# Patient Record
Sex: FEMALE | Race: WHITE | Employment: OTHER | URBAN - METROPOLITAN AREA
[De-identification: names, ages, dates, MRNs, and addresses within clinical notes are randomized per-mention and may not be internally consistent; named-entity substitution may affect disease eponyms.]

---

## 2019-01-10 PROBLEM — R63.4 WEIGHT LOSS, UNINTENTIONAL: Status: ACTIVE | Noted: 2019-01-10

## 2019-01-24 PROBLEM — F33.9 EPISODE OF RECURRENT MAJOR DEPRESSIVE DISORDER (HCC): Status: ACTIVE | Noted: 2019-01-24

## 2019-01-24 PROBLEM — J20.8 ACUTE BRONCHITIS DUE TO OTHER SPECIFIED ORGANISMS: Status: ACTIVE | Noted: 2019-01-24

## 2019-01-24 PROBLEM — F33.9 EPISODE OF RECURRENT MAJOR DEPRESSIVE DISORDER: Status: ACTIVE | Noted: 2019-01-24

## 2019-05-30 PROBLEM — J44.89 COPD (CHRONIC OBSTRUCTIVE PULMONARY DISEASE) WITH CHRONIC BRONCHITIS (HCC): Status: ACTIVE | Noted: 2019-05-30

## 2019-05-30 PROBLEM — F32.A ANXIETY AND DEPRESSION: Status: ACTIVE | Noted: 2019-05-30

## 2019-05-30 PROBLEM — J20.8 ACUTE BRONCHITIS DUE TO OTHER SPECIFIED ORGANISMS: Status: RESOLVED | Noted: 2019-01-24 | Resolved: 2019-05-30

## 2019-05-30 PROBLEM — F41.9 ANXIETY AND DEPRESSION: Status: ACTIVE | Noted: 2019-05-30

## 2019-05-30 PROBLEM — I10 ESSENTIAL HYPERTENSION: Status: ACTIVE | Noted: 2019-05-30

## 2019-05-30 PROBLEM — J44.89 COPD (CHRONIC OBSTRUCTIVE PULMONARY DISEASE) WITH CHRONIC BRONCHITIS: Status: ACTIVE | Noted: 2019-05-30

## 2019-05-30 PROBLEM — J44.9 COPD (CHRONIC OBSTRUCTIVE PULMONARY DISEASE) WITH CHRONIC BRONCHITIS (HCC): Status: ACTIVE | Noted: 2019-05-30

## 2019-05-30 PROBLEM — G47.00 INSOMNIA, UNSPECIFIED TYPE: Status: ACTIVE | Noted: 2019-05-30

## 2019-11-21 PROBLEM — R68.2 DRY MOUTH: Status: ACTIVE | Noted: 2019-11-21

## 2019-11-21 PROBLEM — E78.00 HIGH CHOLESTEROL: Status: ACTIVE | Noted: 2019-11-21

## 2020-01-03 PROBLEM — H72.92 PERFORATED EAR DRUM, LEFT: Status: ACTIVE | Noted: 2020-01-03

## 2020-02-17 ENCOUNTER — OFFICE VISIT (OUTPATIENT)
Dept: AUDIOLOGY | Facility: CLINIC | Age: 75
End: 2020-02-17
Payer: MEDICARE

## 2020-02-17 DIAGNOSIS — H72.92 UNSPECIFIED PERFORATION OF TYMPANIC MEMBRANE, LEFT EAR: Primary | ICD-10-CM

## 2020-02-17 PROCEDURE — 92567 TYMPANOMETRY: CPT | Performed by: AUDIOLOGIST

## 2020-02-17 PROCEDURE — 92557 COMPREHENSIVE HEARING TEST: CPT | Performed by: AUDIOLOGIST

## 2020-02-18 NOTE — PROGRESS NOTES
AUDIOGRAM     Gatito Merchant was referred for testing by Chu Walker. 11/30/1945  [id number]        History      Ms. Mykel Johnston is here today for an audiological evaluation  She has a known tympanic membrane perforation in her left ear.   This has be compliance and ear canal volumes in the right ear. Tympanogram was  flat with a large ear canal volume, consistent with a non-intact TM. in the left ear        Recommendations: Follow up with Dr. Karla Durbin.   Audiological monitoring as needed during the

## 2021-02-03 DIAGNOSIS — Z23 NEED FOR VACCINATION: ICD-10-CM

## 2021-07-19 ENCOUNTER — APPOINTMENT (OUTPATIENT)
Dept: GENERAL RADIOLOGY | Facility: HOSPITAL | Age: 76
DRG: 190 | End: 2021-07-19
Attending: EMERGENCY MEDICINE
Payer: MEDICARE

## 2021-07-19 ENCOUNTER — HOSPITAL ENCOUNTER (INPATIENT)
Facility: HOSPITAL | Age: 76
LOS: 1 days | Discharge: HOME | DRG: 190 | End: 2021-07-22
Attending: EMERGENCY MEDICINE | Admitting: HOSPITALIST
Payer: MEDICARE

## 2021-07-19 DIAGNOSIS — R09.02 HYPOXIA: ICD-10-CM

## 2021-07-19 DIAGNOSIS — J40 BRONCHITIS: Primary | ICD-10-CM

## 2021-07-19 DIAGNOSIS — J98.01 BRONCHOSPASM: ICD-10-CM

## 2021-07-19 DIAGNOSIS — J84.10 PULMONARY FIBROSIS (HCC): ICD-10-CM

## 2021-07-19 LAB
ANION GAP SERPL CALC-SCNC: 9 MMOL/L (ref 0–18)
BASOPHILS # BLD AUTO: 0.01 X10(3) UL (ref 0–0.2)
BASOPHILS NFR BLD AUTO: 0.1 %
BUN BLD-MCNC: 16 MG/DL (ref 7–18)
BUN/CREAT SERPL: 18.4 (ref 10–20)
CALCIUM BLD-MCNC: 8.7 MG/DL (ref 8.5–10.1)
CHLORIDE SERPL-SCNC: 111 MMOL/L (ref 98–112)
CO2 SERPL-SCNC: 22 MMOL/L (ref 21–32)
CREAT BLD-MCNC: 0.87 MG/DL
DEPRECATED RDW RBC AUTO: 47.8 FL (ref 35.1–46.3)
EOSINOPHIL # BLD AUTO: 0.02 X10(3) UL (ref 0–0.7)
EOSINOPHIL NFR BLD AUTO: 0.2 %
ERYTHROCYTE [DISTWIDTH] IN BLOOD BY AUTOMATED COUNT: 13.9 % (ref 11–15)
FLUAV + FLUBV RNA SPEC NAA+PROBE: NEGATIVE
FLUAV + FLUBV RNA SPEC NAA+PROBE: NEGATIVE
GLUCOSE BLD-MCNC: 102 MG/DL (ref 70–99)
HCT VFR BLD AUTO: 32.2 %
HGB BLD-MCNC: 10.9 G/DL
IMM GRANULOCYTES # BLD AUTO: 0.03 X10(3) UL (ref 0–1)
IMM GRANULOCYTES NFR BLD: 0.3 %
LYMPHOCYTES # BLD AUTO: 1.81 X10(3) UL (ref 1–4)
LYMPHOCYTES NFR BLD AUTO: 18.6 %
MCH RBC QN AUTO: 32.2 PG (ref 26–34)
MCHC RBC AUTO-ENTMCNC: 33.9 G/DL (ref 31–37)
MCV RBC AUTO: 95 FL
MONOCYTES # BLD AUTO: 0.4 X10(3) UL (ref 0.1–1)
MONOCYTES NFR BLD AUTO: 4.1 %
NEUTROPHILS # BLD AUTO: 7.47 X10 (3) UL (ref 1.5–7.7)
NEUTROPHILS # BLD AUTO: 7.47 X10(3) UL (ref 1.5–7.7)
NEUTROPHILS NFR BLD AUTO: 76.7 %
NT-PROBNP SERPL-MCNC: 272 PG/ML (ref ?–450)
OSMOLALITY SERPL CALC.SUM OF ELEC: 295 MOSM/KG (ref 275–295)
PLATELET # BLD AUTO: 183 10(3)UL (ref 150–450)
POTASSIUM SERPL-SCNC: 3.8 MMOL/L (ref 3.5–5.1)
PROCALCITONIN SERPL-MCNC: 0.02 NG/ML (ref ?–0.16)
RBC # BLD AUTO: 3.39 X10(6)UL
RSV RNA SPEC NAA+PROBE: NEGATIVE
SARS-COV-2 RNA RESP QL NAA+PROBE: NOT DETECTED
SODIUM SERPL-SCNC: 142 MMOL/L (ref 136–145)
TROPONIN I SERPL-MCNC: <0.045 NG/ML (ref ?–0.04)
WBC # BLD AUTO: 9.7 X10(3) UL (ref 4–11)

## 2021-07-19 PROCEDURE — 71045 X-RAY EXAM CHEST 1 VIEW: CPT | Performed by: EMERGENCY MEDICINE

## 2021-07-19 RX ORDER — METHYLPREDNISOLONE SODIUM SUCCINATE 40 MG/ML
40 INJECTION, POWDER, LYOPHILIZED, FOR SOLUTION INTRAMUSCULAR; INTRAVENOUS EVERY 8 HOURS
Status: DISPENSED | OUTPATIENT
Start: 2021-07-20 | End: 2021-07-21

## 2021-07-19 RX ORDER — METHYLPREDNISOLONE SODIUM SUCCINATE 40 MG/ML
40 INJECTION, POWDER, LYOPHILIZED, FOR SOLUTION INTRAMUSCULAR; INTRAVENOUS ONCE
Status: COMPLETED | OUTPATIENT
Start: 2021-07-19 | End: 2021-07-19

## 2021-07-19 RX ORDER — MELATONIN
3 NIGHTLY PRN
Status: DISCONTINUED | OUTPATIENT
Start: 2021-07-19 | End: 2021-07-22

## 2021-07-19 RX ORDER — POLYETHYLENE GLYCOL 3350 17 G/17G
17 POWDER, FOR SOLUTION ORAL DAILY PRN
Status: DISCONTINUED | OUTPATIENT
Start: 2021-07-19 | End: 2021-07-22

## 2021-07-19 RX ORDER — ASPIRIN 81 MG/1
81 TABLET ORAL DAILY
COMMUNITY

## 2021-07-19 RX ORDER — ACETAMINOPHEN 325 MG/1
650 TABLET ORAL EVERY 6 HOURS PRN
Status: DISCONTINUED | OUTPATIENT
Start: 2021-07-19 | End: 2021-07-22

## 2021-07-19 RX ORDER — ASPIRIN 81 MG/1
81 TABLET ORAL DAILY
Status: DISCONTINUED | OUTPATIENT
Start: 2021-07-19 | End: 2021-07-22

## 2021-07-19 RX ORDER — MIRTAZAPINE 30 MG/1
30 TABLET, FILM COATED ORAL NIGHTLY
Status: DISCONTINUED | OUTPATIENT
Start: 2021-07-19 | End: 2021-07-22

## 2021-07-19 RX ORDER — QUETIAPINE 25 MG/1
25 TABLET, FILM COATED ORAL NIGHTLY
COMMUNITY

## 2021-07-19 RX ORDER — IPRATROPIUM BROMIDE AND ALBUTEROL SULFATE 2.5; .5 MG/3ML; MG/3ML
3 SOLUTION RESPIRATORY (INHALATION) ONCE
Status: COMPLETED | OUTPATIENT
Start: 2021-07-19 | End: 2021-07-19

## 2021-07-19 RX ORDER — ONDANSETRON 2 MG/ML
4 INJECTION INTRAMUSCULAR; INTRAVENOUS EVERY 6 HOURS PRN
Status: DISCONTINUED | OUTPATIENT
Start: 2021-07-19 | End: 2021-07-22

## 2021-07-19 RX ORDER — IPRATROPIUM BROMIDE AND ALBUTEROL SULFATE 2.5; .5 MG/3ML; MG/3ML
3 SOLUTION RESPIRATORY (INHALATION)
Status: DISCONTINUED | OUTPATIENT
Start: 2021-07-19 | End: 2021-07-19

## 2021-07-19 RX ORDER — BENZONATATE 100 MG/1
100 CAPSULE ORAL 3 TIMES DAILY PRN
Status: DISCONTINUED | OUTPATIENT
Start: 2021-07-19 | End: 2021-07-22

## 2021-07-19 RX ORDER — HEPARIN SODIUM 5000 [USP'U]/ML
5000 INJECTION, SOLUTION INTRAVENOUS; SUBCUTANEOUS EVERY 12 HOURS SCHEDULED
Status: DISCONTINUED | OUTPATIENT
Start: 2021-07-19 | End: 2021-07-22

## 2021-07-19 RX ORDER — VENLAFAXINE HYDROCHLORIDE 150 MG/1
150 CAPSULE, EXTENDED RELEASE ORAL DAILY
Status: DISCONTINUED | OUTPATIENT
Start: 2021-07-19 | End: 2021-07-22

## 2021-07-19 RX ORDER — IPRATROPIUM BROMIDE AND ALBUTEROL SULFATE 2.5; .5 MG/3ML; MG/3ML
3 SOLUTION RESPIRATORY (INHALATION)
Status: DISCONTINUED | OUTPATIENT
Start: 2021-07-19 | End: 2021-07-22

## 2021-07-19 RX ORDER — QUETIAPINE 25 MG/1
25 TABLET, FILM COATED ORAL NIGHTLY
Status: DISCONTINUED | OUTPATIENT
Start: 2021-07-19 | End: 2021-07-20

## 2021-07-19 RX ORDER — GABAPENTIN 600 MG/1
600 TABLET ORAL 3 TIMES DAILY
Status: DISCONTINUED | OUTPATIENT
Start: 2021-07-19 | End: 2021-07-22

## 2021-07-19 RX ORDER — ALBUTEROL SULFATE 2.5 MG/3ML
5 SOLUTION RESPIRATORY (INHALATION) ONCE
Status: COMPLETED | OUTPATIENT
Start: 2021-07-19 | End: 2021-07-19

## 2021-07-19 RX ORDER — BISACODYL 10 MG
10 SUPPOSITORY, RECTAL RECTAL
Status: DISCONTINUED | OUTPATIENT
Start: 2021-07-19 | End: 2021-07-22

## 2021-07-19 NOTE — ED INITIAL ASSESSMENT (HPI)
Pt to ED via EMS for c/o cough x 4 days. Pt reports little amount of sputum. Pt also c/o right upper back pain.

## 2021-07-19 NOTE — ED QUICK NOTES
Assumed care of Mata Sarmiento upon arrival in room 26 via triage. Patient A&Ox4, see triage note and nursing assessment. Persistent, strong cough present. Lungs bilaterally with expiratory wheeze noted. Patient denies chest pain.  Some c/o flank pain worse with co

## 2021-07-19 NOTE — ED NOTES
Orders for admission, patient is aware of plan and ready to go upstairs. Any questions, please call ED RN Yael Gardiner  at extension [phone].    Type of COVID test sent:Rapid---neg  COVID Suspicion level: Low    Titratable drug(s) infusing: n/a  Rate:    LOC at time

## 2021-07-20 PROBLEM — F41.1 GENERALIZED ANXIETY DISORDER: Status: ACTIVE | Noted: 2021-07-20

## 2021-07-20 PROBLEM — F43.29 PERSISTENT COMPLEX BEREAVEMENT DISORDER: Status: ACTIVE | Noted: 2021-07-20

## 2021-07-20 PROBLEM — F43.81 PERSISTENT COMPLEX BEREAVEMENT DISORDER: Status: ACTIVE | Noted: 2021-07-20

## 2021-07-20 PROBLEM — F33.1 MAJOR DEPRESSIVE DISORDER, RECURRENT EPISODE, MODERATE (HCC): Status: ACTIVE | Noted: 2019-01-24

## 2021-07-20 LAB
ANION GAP SERPL CALC-SCNC: 6 MMOL/L (ref 0–18)
BASOPHILS # BLD AUTO: 0.01 X10(3) UL (ref 0–0.2)
BASOPHILS NFR BLD AUTO: 0.1 %
BUN BLD-MCNC: 15 MG/DL (ref 7–18)
BUN/CREAT SERPL: 19 (ref 10–20)
CALCIUM BLD-MCNC: 9.1 MG/DL (ref 8.5–10.1)
CHLORIDE SERPL-SCNC: 114 MMOL/L (ref 98–112)
CO2 SERPL-SCNC: 22 MMOL/L (ref 21–32)
CREAT BLD-MCNC: 0.79 MG/DL
DEPRECATED HBV CORE AB SER IA-ACNC: 42 NG/ML
DEPRECATED RDW RBC AUTO: 49.3 FL (ref 35.1–46.3)
EOSINOPHIL # BLD AUTO: 0 X10(3) UL (ref 0–0.7)
EOSINOPHIL NFR BLD AUTO: 0 %
ERYTHROCYTE [DISTWIDTH] IN BLOOD BY AUTOMATED COUNT: 13.8 % (ref 11–15)
GLUCOSE BLD-MCNC: 134 MG/DL (ref 70–99)
HAV IGM SER QL: 2.5 MG/DL (ref 1.6–2.6)
HCT VFR BLD AUTO: 30.7 %
HGB BLD-MCNC: 10.4 G/DL
IMM GRANULOCYTES # BLD AUTO: 0.07 X10(3) UL (ref 0–1)
IMM GRANULOCYTES NFR BLD: 0.8 %
IRON SATURATION: 14 %
IRON SERPL-MCNC: 58 UG/DL
LYMPHOCYTES # BLD AUTO: 0.83 X10(3) UL (ref 1–4)
LYMPHOCYTES NFR BLD AUTO: 9.3 %
MCH RBC QN AUTO: 32.9 PG (ref 26–34)
MCHC RBC AUTO-ENTMCNC: 33.9 G/DL (ref 31–37)
MCV RBC AUTO: 97.2 FL
MONOCYTES # BLD AUTO: 0.09 X10(3) UL (ref 0.1–1)
MONOCYTES NFR BLD AUTO: 1 %
NEUTROPHILS # BLD AUTO: 7.92 X10 (3) UL (ref 1.5–7.7)
NEUTROPHILS # BLD AUTO: 7.92 X10(3) UL (ref 1.5–7.7)
NEUTROPHILS NFR BLD AUTO: 88.8 %
OSMOLALITY SERPL CALC.SUM OF ELEC: 297 MOSM/KG (ref 275–295)
PLATELET # BLD AUTO: 168 10(3)UL (ref 150–450)
POTASSIUM SERPL-SCNC: 4.2 MMOL/L (ref 3.5–5.1)
RBC # BLD AUTO: 3.16 X10(6)UL
SODIUM SERPL-SCNC: 142 MMOL/L (ref 136–145)
TOTAL IRON BINDING CAPACITY: 401 UG/DL (ref 240–450)
TRANSFERRIN SERPL-MCNC: 269 MG/DL (ref 200–360)
WBC # BLD AUTO: 8.9 X10(3) UL (ref 4–11)

## 2021-07-20 PROCEDURE — 90792 PSYCH DIAG EVAL W/MED SRVCS: CPT | Performed by: OTHER

## 2021-07-20 RX ORDER — PREDNISONE 20 MG/1
40 TABLET ORAL
Status: DISCONTINUED | OUTPATIENT
Start: 2021-07-21 | End: 2021-07-22

## 2021-07-20 RX ORDER — CLONAZEPAM 0.5 MG/1
0.25 TABLET ORAL 2 TIMES DAILY PRN
Status: DISCONTINUED | OUTPATIENT
Start: 2021-07-20 | End: 2021-07-22

## 2021-07-20 RX ORDER — NICOTINE 21 MG/24HR
1 PATCH, TRANSDERMAL 24 HOURS TRANSDERMAL DAILY
Status: DISCONTINUED | OUTPATIENT
Start: 2021-07-21 | End: 2021-07-22

## 2021-07-20 RX ORDER — MELATONIN
325
Status: DISCONTINUED | OUTPATIENT
Start: 2021-07-21 | End: 2021-07-22

## 2021-07-20 RX ORDER — QUETIAPINE 25 MG/1
25 TABLET, FILM COATED ORAL ONCE
Status: COMPLETED | OUTPATIENT
Start: 2021-07-20 | End: 2021-07-21

## 2021-07-20 RX ORDER — QUETIAPINE 25 MG/1
50 TABLET, FILM COATED ORAL NIGHTLY
Status: DISCONTINUED | OUTPATIENT
Start: 2021-07-21 | End: 2021-07-22

## 2021-07-20 NOTE — H&P
[facility] Hospitalist H&P       CC: Patient presents with:  Cough  Chest Pain Angina       PCP: Gretchen Owen MD    History of Present Illness: Patient is a 66year old female with PMH sig for Depression, anxiety, GERD, HLD not on meds, active tobacco use Problems Mother    • Cancer Brother        Review of Systems  Comprehensive ROS reviewed and negative except for what's stated above.      OBJECTIVE:  /83   Pulse 74   Temp 98.3 °F (36.8 °C) (Oral)   Resp 25   Ht 5' 5\" (1.651 m)   Wt 125 lb (56.7 kg) for 4 days.      Acute hypoxic respiratory failure secondary to likely COPD exacerbation  -Chest x-ray with bibasilar infiltrates, pro-Sung negative, afebrile, normal BNP and troponin  -No formal diagnosis of COPD, will need outpatient PFTs  -Wheezing noted

## 2021-07-20 NOTE — PLAN OF CARE
Problem: Patient Centered Care  Goal: Patient preferences are identified and integrated in the patient's plan of care  Description: Interventions:  - What would you like us to know as we care for you?  From home with daughter.  - Provide timely, complete, CO2 retention  Outcome: Progressing     Problem: SAFETY ADULT - FALL  Goal: Free from fall injury  Description: INTERVENTIONS:  - Assess pt frequently for physical needs  - Identify cognitive and physical deficits and behaviors that affect risk of falls.

## 2021-07-20 NOTE — PROGRESS NOTES
O2 sat on room air at rest 88%,hr 86  O2 sat on 2L O2 at rest-91%,hr 88  O2 sat on ambulation with 3L O2 92%,hr 102,pt ambulated on hallway 300ft,c/o slight sob with ambulation

## 2021-07-20 NOTE — CONSULTS
Pulmonary Consult     Assessment / Plan:  1. Hypoxia - due to AECOPD  - wean supplemental O2 as able  2. AECOPD  - IV to PO steroids tomorrow  - start anoro; continue on DC  - BD protocol  - outpatient PFT  - qualifies for lung cancer screening  3.  Likely Tablet 24 Hr, Take 150 mg by mouth daily. , Disp: , Rfl:   mirtazapine 30 MG Oral Tab, Take 1 tablet (30 mg total) by mouth nightly., Disp: 90 tablet, Rfl: 1       No Known Allergies   Social History    Tobacco Use      Smoking status: Current Every Day Smo

## 2021-07-20 NOTE — PROGRESS NOTES
[facility] Hospitalist Progress Note     CC: Hospital Follow up    PCP: Herbert Delgado MD       Assessment/Plan:     Principal Problem:    Bronchospasm  Active Problems:    Hypoxia    Pulmonary fibrosis [facility])    Patient is a 66year old female with PMH sig for kg)  01/03/20 1309 : 119 lb (54 kg)  11/21/19 1321 : 113 lb (51.3 kg)      Exam   Gen: No acute distress, alert and oriented x3   Heent: NC AT,    Pulm: Lungs clear,   CV: Heart with regular rate and rhythm   Abd: Abdomen soft, nontender, nondistended   MS

## 2021-07-20 NOTE — PLAN OF CARE
Pt admitted to floor from ER. Oriented to room. Receiving scheduled duonebs and IV solumedrol. + cough - mucinex ordered. Vitals stable at this time. Fall risk precautions in place. Will continue to monitor.      Problem: Patient Centered Care  Goal: Kareem for suctioning and perform as needed  - Assess and instruct to report SOB or any respiratory difficulty  - Respiratory Therapy support as indicated  - Manage/alleviate anxiety  - Monitor for signs/symptoms of CO2 retention  Outcome: Progressing     Problem

## 2021-07-21 PROBLEM — J40 BRONCHITIS: Status: ACTIVE | Noted: 2021-07-21

## 2021-07-21 NOTE — PLAN OF CARE
Patient has no complaints throughout the day. On room air. Self. Oral prednisone. Family at bedside.   Problem: Patient Centered Care  Goal: Patient preferences are identified and integrated in the patient's plan of care  Description: Interventions:  - What Respiratory Therapy support as indicated  - Manage/alleviate anxiety  - Monitor for signs/symptoms of CO2 retention  Outcome: Progressing     Problem: SAFETY ADULT - FALL  Goal: Free from fall injury  Description: INTERVENTIONS:  - Assess pt frequently for

## 2021-07-21 NOTE — PROGRESS NOTES
[facility] Hospitalist Progress Note     CC: Hospital Follow up    PCP: Glenny Wheeler MD       Assessment/Plan:     Principal Problem:    Bronchospasm  Active Problems:    Major depressive disorder, recurrent episode, moderate ([address]    Hypoxia    Pulmonary fi 119/63      Intake/Output:  No intake or output data in the 24 hours ending 07/21/21 0839    Last 3 Weights  07/19/21 1512 : 125 lb (56.7 kg)  01/03/20 1309 : 119 lb (54 kg)  11/21/19 1321 : 113 lb (51.3 kg)      Exam   Gen: No acute distress, alert and or Nebulization QID   • DM-guaiFENesin ER  1 tablet Oral BID       ClonazePAM, acetaminophen, PEG 3350, magnesium hydroxide, bisacodyl, ondansetron HCl, benzonatate, melatonin

## 2021-07-21 NOTE — PLAN OF CARE
No acute changes overnight. Currently on 2.5L O2 via NC. Vitals stable at this time. Will continue to monitor.      Problem: Patient Centered Care  Goal: Patient preferences are identified and integrated in the patient's plan of care  Description: Srinivasan Stover difficulty  - Respiratory Therapy support as indicated  - Manage/alleviate anxiety  - Monitor for signs/symptoms of CO2 retention  Outcome: Progressing     Problem: SAFETY ADULT - FALL  Goal: Free from fall injury  Description: INTERVENTIONS:  - Assess pt

## 2021-07-21 NOTE — CONSULTS
[facility]    Report of Consultation    Alois Older Patient Status:  Observation    1945 MRN [id number]   Location [facility] 5SW/SE Attending Alivia Baltazar MD   Hosp Day # 0 PCP Megan Bynum MD     Date of Admissi increased rumination and anxiety. Patient today denying hopelessness or helplessness but admitted to occasional panicky feeling she cannot predict or controlled. Patient also admitted episodes of sobbing and tearfulness.     The patient denied any homic injection 5,000 Units, 5,000 Units, Subcutaneous, 2 times per day  acetaminophen (TYLENOL) tab 650 mg, 650 mg, Oral, Q6H PRN  PEG 3350 (MIRALAX) powder packet 17 g, 17 g, Oral, Daily PRN  magnesium hydroxide (MILK OF MAGNESIA) 400 MG/5ML suspension 30 mL, PHOS 4.8 01/18/2019    TROP <0.045 07/19/2021         Imaging:  XR CHEST AP PORTABLE  (CPT=71045)    Result Date: 7/19/2021  CONCLUSION:  1. Chronic bibasilar interstitial infiltrates, probably fibrosis.     Dictated by (CST): Daniela Moore MD the patient has been follow-up regularly and compliant with the treatment. Discussed risk and benefit, acknowledging the current symptom and severity. At this point, I would recommend the following approach:     1. Focus on education and support.   2.

## 2021-07-21 NOTE — PROGRESS NOTES
Pulmonary Progress Note     Assessment / Plan:  1. Hypoxia - due to AECOPD  - supplemental oxygen as needed; currently RA  2.  AECOPD  - PO steroids; complete taper on DC  - start anoro; continue on DC  - BD protocol  - outpatient PFT  - qualifies for lung

## 2021-07-22 VITALS
HEIGHT: 65 IN | SYSTOLIC BLOOD PRESSURE: 125 MMHG | DIASTOLIC BLOOD PRESSURE: 62 MMHG | TEMPERATURE: 99 F | BODY MASS INDEX: 20.83 KG/M2 | OXYGEN SATURATION: 94 % | RESPIRATION RATE: 16 BRPM | HEART RATE: 83 BPM | WEIGHT: 125 LBS

## 2021-07-22 LAB
BASOPHILS # BLD AUTO: 0.02 X10(3) UL (ref 0–0.2)
BASOPHILS NFR BLD AUTO: 0.2 %
DEPRECATED RDW RBC AUTO: 53.5 FL (ref 35.1–46.3)
EOSINOPHIL # BLD AUTO: 0.03 X10(3) UL (ref 0–0.7)
EOSINOPHIL NFR BLD AUTO: 0.2 %
ERYTHROCYTE [DISTWIDTH] IN BLOOD BY AUTOMATED COUNT: 14.8 % (ref 11–15)
HCT VFR BLD AUTO: 30.7 %
HGB BLD-MCNC: 9.9 G/DL
IMM GRANULOCYTES # BLD AUTO: 0.09 X10(3) UL (ref 0–1)
IMM GRANULOCYTES NFR BLD: 0.7 %
LYMPHOCYTES # BLD AUTO: 2.69 X10(3) UL (ref 1–4)
LYMPHOCYTES NFR BLD AUTO: 20.9 %
MCH RBC QN AUTO: 32 PG (ref 26–34)
MCHC RBC AUTO-ENTMCNC: 32.2 G/DL (ref 31–37)
MCV RBC AUTO: 99.4 FL
MONOCYTES # BLD AUTO: 0.87 X10(3) UL (ref 0.1–1)
MONOCYTES NFR BLD AUTO: 6.7 %
NEUTROPHILS # BLD AUTO: 9.2 X10 (3) UL (ref 1.5–7.7)
NEUTROPHILS # BLD AUTO: 9.2 X10(3) UL (ref 1.5–7.7)
NEUTROPHILS NFR BLD AUTO: 71.3 %
PLATELET # BLD AUTO: 184 10(3)UL (ref 150–450)
RBC # BLD AUTO: 3.09 X10(6)UL
WBC # BLD AUTO: 12.9 X10(3) UL (ref 4–11)

## 2021-07-22 RX ORDER — ALBUTEROL SULFATE 90 UG/1
2 AEROSOL, METERED RESPIRATORY (INHALATION) EVERY 4 HOURS PRN
Qty: 1 EACH | Refills: 0 | Status: SHIPPED | OUTPATIENT
Start: 2021-07-22

## 2021-07-22 RX ORDER — PREDNISONE 20 MG/1
TABLET ORAL
Qty: 15 TABLET | Refills: 0 | Status: SHIPPED | OUTPATIENT
Start: 2021-07-23 | End: 2021-08-04

## 2021-07-22 NOTE — DISCHARGE SUMMARY
General Medicine Discharge Summary     Patient ID:  Shaneka Pineda  66year old  11/30/1945    Admit date: 7/19/2021    Discharge date and time: 7/22/2021 12:10 PM     Attending Physician: Frandy Vides R-0    umeclidinium-vilanterol 62.5-25 MCG/INH Inhalation Aerosol Powder, Breath Activated  Inhale 1 puff into the lungs daily. , Normal, Disp-14 each, R-0      CONTINUE these medications which have NOT CHANGED    QUEtiapine Fumarate 25 MG Oral Tab  Take 25

## 2021-07-22 NOTE — PLAN OF CARE
Plan to discharge home. No needs for home oxygen. Problem: Patient Centered Care  Goal: Patient preferences are identified and integrated in the patient's plan of care  Description: Interventions:  - What would you like us to know as we care for you?  Tr Wilson Manage/alleviate anxiety  - Monitor for signs/symptoms of CO2 retention  Outcome: Progressing     Problem: SAFETY ADULT - FALL  Goal: Free from fall injury  Description: INTERVENTIONS:  - Assess pt frequently for physical needs  - Identify cognitive and ph

## 2021-07-22 NOTE — PROGRESS NOTES
Discharge RN Summary: Patient has discharge order in. Patient to discharge  Home. IV removed by this RN. Understands to follow up with PCP in 1 week. Patient understands to  meds from pharmcy.        Scripts sent with pt: none  Electronically sent p

## 2021-07-22 NOTE — PROGRESS NOTES
Walk Test    Resting on room air: 96% O2, HR 84  Ambulating on room air: 88% O2,     Per SW, patient does not qualify for oxygen at this time.

## 2021-08-31 ENCOUNTER — LAB ENCOUNTER (OUTPATIENT)
Dept: LAB | Age: 76
End: 2021-08-31
Attending: INTERNAL MEDICINE
Payer: MEDICARE

## 2021-08-31 DIAGNOSIS — E78.00 HIGH CHOLESTEROL: ICD-10-CM

## 2021-08-31 LAB
ALBUMIN SERPL-MCNC: 3.3 G/DL (ref 3.4–5)
ALBUMIN/GLOB SERPL: 1 {RATIO} (ref 1–2)
ALP LIVER SERPL-CCNC: 83 U/L
ALT SERPL-CCNC: 20 U/L
ANION GAP SERPL CALC-SCNC: 7 MMOL/L (ref 0–18)
AST SERPL-CCNC: 10 U/L (ref 15–37)
BILIRUB SERPL-MCNC: 0.5 MG/DL (ref 0.1–2)
BUN BLD-MCNC: 24 MG/DL (ref 7–18)
BUN/CREAT SERPL: 22 (ref 10–20)
CALCIUM BLD-MCNC: 8.9 MG/DL (ref 8.5–10.1)
CHLORIDE SERPL-SCNC: 112 MMOL/L (ref 98–112)
CHOLEST SMN-MCNC: 147 MG/DL (ref ?–200)
CO2 SERPL-SCNC: 24 MMOL/L (ref 21–32)
CREAT BLD-MCNC: 1.09 MG/DL
GLOBULIN PLAS-MCNC: 3.4 G/DL (ref 2.8–4.4)
GLUCOSE BLD-MCNC: 96 MG/DL (ref 70–99)
HDLC SERPL-MCNC: 53 MG/DL (ref 40–59)
LDLC SERPL CALC-MCNC: 83 MG/DL (ref ?–100)
M PROTEIN MFR SERPL ELPH: 6.7 G/DL (ref 6.4–8.2)
NONHDLC SERPL-MCNC: 94 MG/DL (ref ?–130)
OSMOLALITY SERPL CALC.SUM OF ELEC: 300 MOSM/KG (ref 275–295)
PATIENT FASTING Y/N/NP: YES
PATIENT FASTING Y/N/NP: YES
POTASSIUM SERPL-SCNC: 5.3 MMOL/L (ref 3.5–5.1)
SODIUM SERPL-SCNC: 143 MMOL/L (ref 136–145)
TRIGL SERPL-MCNC: 54 MG/DL (ref 30–149)
TSI SER-ACNC: 1.64 MIU/ML (ref 0.36–3.74)
VLDLC SERPL CALC-MCNC: 8 MG/DL (ref 0–30)

## 2021-08-31 PROCEDURE — 84443 ASSAY THYROID STIM HORMONE: CPT

## 2021-08-31 PROCEDURE — 36415 COLL VENOUS BLD VENIPUNCTURE: CPT

## 2021-08-31 PROCEDURE — 80061 LIPID PANEL: CPT

## 2021-08-31 PROCEDURE — 80053 COMPREHEN METABOLIC PANEL: CPT

## 2021-10-26 ENCOUNTER — OFFICE VISIT (OUTPATIENT)
Dept: OTOLARYNGOLOGY | Facility: CLINIC | Age: 76
End: 2021-10-26
Payer: MEDICARE

## 2021-10-26 VITALS — HEIGHT: 60 IN | WEIGHT: 135 LBS | BODY MASS INDEX: 26.5 KG/M2

## 2021-10-26 DIAGNOSIS — H72.92 PERFORATED EAR DRUM, LEFT: ICD-10-CM

## 2021-10-26 DIAGNOSIS — F17.200 SMOKER: ICD-10-CM

## 2021-10-26 DIAGNOSIS — J38.1 VOCAL CORD POLYPS: Primary | ICD-10-CM

## 2021-10-26 PROCEDURE — 99214 OFFICE O/P EST MOD 30 MIN: CPT | Performed by: SPECIALIST

## 2021-10-26 NOTE — PATIENT INSTRUCTIONS
Ears fully cleaned of cerumen  Polypoid changes of the bilateral vocal cords but no tumor seen. Take Wellbutrin to try to stop smoking. Follow-up in 6 months time, sooner if problems.

## 2021-10-26 NOTE — PROGRESS NOTES
Dru Clifford is a 66year old female. Patient presents with:  Throat Problem: pt presents today for check up on throat w/ spitting up phegm    HPI:   Patient is a current smoker. She wanted to get her vocal cords checked.     Current Outpatient [facility] appearance - Normal.   Head/Face Facial features - Normal. Eyebrows - Normal. Skull - Normal.   Eyes Conjunctiva - Right: Normal, Left: Normal. Pupil - Right: Normal, Left: Normal.    Ears Inspection - Right: Normal, Left: Normal.   Canal -tortuous canals

## 2021-12-23 ENCOUNTER — APPOINTMENT (OUTPATIENT)
Dept: GENERAL RADIOLOGY | Age: 76
End: 2021-12-23
Attending: NURSE PRACTITIONER
Payer: MEDICARE

## 2021-12-23 ENCOUNTER — HOSPITAL ENCOUNTER (OUTPATIENT)
Age: 76
Discharge: HOME | End: 2021-12-23
Payer: MEDICARE

## 2021-12-23 VITALS
BODY MASS INDEX: 25.52 KG/M2 | SYSTOLIC BLOOD PRESSURE: 149 MMHG | RESPIRATION RATE: 18 BRPM | WEIGHT: 130 LBS | HEART RATE: 86 BPM | DIASTOLIC BLOOD PRESSURE: 82 MMHG | HEIGHT: 60 IN | OXYGEN SATURATION: 94 % | TEMPERATURE: 98 F

## 2021-12-23 DIAGNOSIS — M25.561 ACUTE PAIN OF RIGHT KNEE: Primary | ICD-10-CM

## 2021-12-23 DIAGNOSIS — S83.91XA SPRAIN OF RIGHT KNEE, UNSPECIFIED LIGAMENT, INITIAL ENCOUNTER: ICD-10-CM

## 2021-12-23 PROCEDURE — 73562 X-RAY EXAM OF KNEE 3: CPT | Performed by: NURSE PRACTITIONER

## 2021-12-23 PROCEDURE — 99203 OFFICE O/P NEW LOW 30 MIN: CPT | Performed by: NURSE PRACTITIONER

## 2021-12-23 RX ORDER — CYCLOBENZAPRINE HCL 10 MG
10 TABLET ORAL 3 TIMES DAILY PRN
Qty: 20 TABLET | Refills: 0 | Status: SHIPPED | OUTPATIENT
Start: 2021-12-23 | End: 2021-12-30

## 2021-12-23 RX ORDER — IBUPROFEN 600 MG/1
600 TABLET ORAL EVERY 8 HOURS PRN
Qty: 30 TABLET | Refills: 0 | Status: SHIPPED | OUTPATIENT
Start: 2021-12-23 | End: 2021-12-30

## 2021-12-23 NOTE — ED INITIAL ASSESSMENT (HPI)
Pt states injured R knee going down steps and twisted knee on Tuesday, has been using knee brace w no help.

## 2021-12-23 NOTE — ED PROVIDER NOTES
Patient Seen in: [facility]      History   No chief complaint on file. Stated Complaint: right knee injury    Subjective:   Tee Kumar is a 64year-old female who presents to the immediate care complaining of right knee pain.  Kareem HENT:      Head: Normocephalic and atraumatic.       Right Ear: Tympanic membrane, ear canal and external ear normal.      Left Ear: Tympanic membrane, ear canal and external ear normal.      Nose: Nose normal.      Mouth/Throat:      Mouth: Mucous Marline Night swelling. EFFUSION: Small suprapatellar joint effusion. OTHER: Atherosclerosis.                        =====    CONCLUSION:     1. No radiographically visible acute osseous injury of the right knee.     2. Mild-to-moderate tricompartmental osteoarthr includes but not limited to OP/IP visits, radiology tests , clinical labs tests, EKG's, and medication.            Disposition and Plan     Clinical Impression:  Acute pain of right knee  (primary encounter diagnosis)  Sprain of right knee, unspecified liga

## 2022-01-14 ENCOUNTER — HOSPITAL ENCOUNTER (OUTPATIENT)
Age: 77
Discharge: HOME | End: 2022-01-14
Payer: MEDICARE

## 2022-01-14 ENCOUNTER — APPOINTMENT (OUTPATIENT)
Dept: GENERAL RADIOLOGY | Age: 77
End: 2022-01-14
Attending: NURSE PRACTITIONER
Payer: MEDICARE

## 2022-01-14 VITALS
HEIGHT: 60 IN | RESPIRATION RATE: 18 BRPM | HEART RATE: 104 BPM | OXYGEN SATURATION: 96 % | DIASTOLIC BLOOD PRESSURE: 87 MMHG | TEMPERATURE: 98 F | SYSTOLIC BLOOD PRESSURE: 142 MMHG | WEIGHT: 138 LBS | BODY MASS INDEX: 27.09 KG/M2

## 2022-01-14 DIAGNOSIS — S80.01XA CONTUSION OF RIGHT KNEE, INITIAL ENCOUNTER: Primary | ICD-10-CM

## 2022-01-14 PROCEDURE — 73560 X-RAY EXAM OF KNEE 1 OR 2: CPT | Performed by: NURSE PRACTITIONER

## 2022-01-14 PROCEDURE — 99213 OFFICE O/P EST LOW 20 MIN: CPT | Performed by: NURSE PRACTITIONER

## 2022-01-14 NOTE — ED INITIAL ASSESSMENT (HPI)
Pt slipped on the ice and fell back on Tuesday. +head injury. No loc. pt has right knee pain and lower back pain.

## 2022-01-14 NOTE — ED PROVIDER NOTES
Patient Seen in: [facility]      History   Patient presents with:  Fall    Stated Complaint: fell/area around rt knee    Subjective:   HPI    This is a well-appearing 69-year-old who presents with right knee pain.  Pt reports she slipped on th Physical Exam  Vitals and nursing note reviewed. Constitutional:       General: She is awake. She is not in acute distress. Appearance: Normal appearance. She is not ill-appearing or diaphoretic.    HENT:      Head: Normocephalic and atraumati medial joint space and patellofemoral joint. Chondrocalcinosis of the lateral and medial menisci. Small knee joint effusion may have increased from previous study.     Dictated by (CST): Keily Willams MD on 1/14/2022 at 11:39 AM     Finalized by (CST): RICARDA

## 2022-06-21 ENCOUNTER — OFFICE VISIT (OUTPATIENT)
Dept: OTOLARYNGOLOGY | Facility: CLINIC | Age: 77
End: 2022-06-21
Payer: MEDICARE

## 2022-06-21 VITALS — WEIGHT: 132 LBS | BODY MASS INDEX: 25.91 KG/M2 | HEIGHT: 60 IN

## 2022-06-21 DIAGNOSIS — H61.23 CERUMEN DEBRIS ON TYMPANIC MEMBRANE OF BOTH EARS: Primary | ICD-10-CM

## 2022-06-21 PROCEDURE — 69210 REMOVE IMPACTED EAR WAX UNI: CPT | Performed by: SPECIALIST

## 2022-06-21 RX ORDER — TRAMADOL HYDROCHLORIDE 50 MG/1
TABLET ORAL
COMMUNITY
Start: 2022-06-02 | End: 2022-06-21

## 2022-06-21 RX ORDER — CLONAZEPAM 0.5 MG/1
0.5 TABLET ORAL NIGHTLY
COMMUNITY
Start: 2022-06-15

## 2022-06-21 RX ORDER — LITHIUM CARBONATE 150 MG/1
150 CAPSULE ORAL 2 TIMES DAILY
COMMUNITY
Start: 2022-06-01

## 2022-06-21 RX ORDER — CELECOXIB 200 MG/1
200 CAPSULE ORAL
COMMUNITY
Start: 2022-06-02

## 2022-07-04 ENCOUNTER — APPOINTMENT (OUTPATIENT)
Dept: GENERAL RADIOLOGY | Facility: HOSPITAL | Age: 77
End: 2022-07-04
Attending: EMERGENCY MEDICINE
Payer: MEDICARE

## 2022-07-04 ENCOUNTER — HOSPITAL ENCOUNTER (EMERGENCY)
Facility: HOSPITAL | Age: 77
Discharge: LEFT BEFORE DISCHARGE | End: 2022-07-04
Attending: EMERGENCY MEDICINE
Payer: MEDICARE

## 2022-07-04 ENCOUNTER — APPOINTMENT (OUTPATIENT)
Dept: CT IMAGING | Facility: HOSPITAL | Age: 77
End: 2022-07-04
Attending: EMERGENCY MEDICINE
Payer: MEDICARE

## 2022-07-04 VITALS
DIASTOLIC BLOOD PRESSURE: 72 MMHG | WEIGHT: 132 LBS | HEIGHT: 60 IN | RESPIRATION RATE: 20 BRPM | BODY MASS INDEX: 25.91 KG/M2 | OXYGEN SATURATION: 96 % | HEART RATE: 85 BPM | TEMPERATURE: 100 F | SYSTOLIC BLOOD PRESSURE: 118 MMHG

## 2022-07-04 DIAGNOSIS — R06.00 DYSPNEA, UNSPECIFIED TYPE: Primary | ICD-10-CM

## 2022-07-04 DIAGNOSIS — J44.1 COPD EXACERBATION (HCC): ICD-10-CM

## 2022-07-04 DIAGNOSIS — D64.9 NORMOCYTIC ANEMIA: ICD-10-CM

## 2022-07-04 LAB
ANION GAP SERPL CALC-SCNC: 6 MMOL/L (ref 0–18)
BASOPHILS # BLD AUTO: 0.02 X10(3) UL (ref 0–0.2)
BASOPHILS NFR BLD AUTO: 0.2 %
BUN BLD-MCNC: 12 MG/DL (ref 7–18)
BUN/CREAT SERPL: 10.7 (ref 10–20)
CALCIUM BLD-MCNC: 8.5 MG/DL (ref 8.5–10.1)
CHLORIDE SERPL-SCNC: 104 MMOL/L (ref 98–112)
CO2 SERPL-SCNC: 23 MMOL/L (ref 21–32)
CREAT BLD-MCNC: 1.12 MG/DL
D DIMER PPP FEU-MCNC: 4.55 UG/ML FEU (ref ?–0.78)
DEPRECATED HBV CORE AB SER IA-ACNC: 185.2 NG/ML
DEPRECATED RDW RBC AUTO: 50.9 FL (ref 35.1–46.3)
EOSINOPHIL # BLD AUTO: 0.03 X10(3) UL (ref 0–0.7)
EOSINOPHIL NFR BLD AUTO: 0.3 %
ERYTHROCYTE [DISTWIDTH] IN BLOOD BY AUTOMATED COUNT: 14 % (ref 11–15)
GLUCOSE BLD-MCNC: 115 MG/DL (ref 70–99)
HCT VFR BLD AUTO: 23.6 %
HGB BLD-MCNC: 7.5 G/DL
IMM GRANULOCYTES # BLD AUTO: 0.05 X10(3) UL (ref 0–1)
IMM GRANULOCYTES NFR BLD: 0.4 %
IRON SATN MFR SERPL: 9 %
IRON SERPL-MCNC: 22 UG/DL
LYMPHOCYTES # BLD AUTO: 1.02 X10(3) UL (ref 1–4)
LYMPHOCYTES NFR BLD AUTO: 9.1 %
MCH RBC QN AUTO: 31.6 PG (ref 26–34)
MCHC RBC AUTO-ENTMCNC: 31.8 G/DL (ref 31–37)
MCV RBC AUTO: 99.6 FL
MONOCYTES # BLD AUTO: 0.63 X10(3) UL (ref 0.1–1)
MONOCYTES NFR BLD AUTO: 5.6 %
NEUTROPHILS # BLD AUTO: 9.41 X10 (3) UL (ref 1.5–7.7)
NEUTROPHILS # BLD AUTO: 9.41 X10(3) UL (ref 1.5–7.7)
NEUTROPHILS NFR BLD AUTO: 84.4 %
OSMOLALITY SERPL CALC.SUM OF ELEC: 277 MOSM/KG (ref 275–295)
PLATELET # BLD AUTO: 365 10(3)UL (ref 150–450)
POTASSIUM SERPL-SCNC: 3.9 MMOL/L (ref 3.5–5.1)
PROCALCITONIN SERPL-MCNC: 0.2 NG/ML (ref ?–0.16)
RBC # BLD AUTO: 2.37 X10(6)UL
SODIUM SERPL-SCNC: 133 MMOL/L (ref 136–145)
TIBC SERPL-MCNC: 232 UG/DL (ref 240–450)
TRANSFERRIN SERPL-MCNC: 156 MG/DL (ref 200–360)
TROPONIN I HIGH SENSITIVITY: 5 NG/L
WBC # BLD AUTO: 11.2 X10(3) UL (ref 4–11)

## 2022-07-04 PROCEDURE — 96374 THER/PROPH/DIAG INJ IV PUSH: CPT

## 2022-07-04 PROCEDURE — 93005 ELECTROCARDIOGRAM TRACING: CPT

## 2022-07-04 PROCEDURE — 84145 PROCALCITONIN (PCT): CPT | Performed by: EMERGENCY MEDICINE

## 2022-07-04 PROCEDURE — 84484 ASSAY OF TROPONIN QUANT: CPT | Performed by: EMERGENCY MEDICINE

## 2022-07-04 PROCEDURE — 85379 FIBRIN DEGRADATION QUANT: CPT | Performed by: EMERGENCY MEDICINE

## 2022-07-04 PROCEDURE — 99284 EMERGENCY DEPT VISIT MOD MDM: CPT

## 2022-07-04 PROCEDURE — 71045 X-RAY EXAM CHEST 1 VIEW: CPT | Performed by: EMERGENCY MEDICINE

## 2022-07-04 PROCEDURE — 83540 ASSAY OF IRON: CPT | Performed by: EMERGENCY MEDICINE

## 2022-07-04 PROCEDURE — 94644 CONT INHLJ TX 1ST HOUR: CPT

## 2022-07-04 PROCEDURE — 84466 ASSAY OF TRANSFERRIN: CPT | Performed by: EMERGENCY MEDICINE

## 2022-07-04 PROCEDURE — 99285 EMERGENCY DEPT VISIT HI MDM: CPT

## 2022-07-04 PROCEDURE — 82728 ASSAY OF FERRITIN: CPT | Performed by: EMERGENCY MEDICINE

## 2022-07-04 PROCEDURE — 85025 COMPLETE CBC W/AUTO DIFF WBC: CPT | Performed by: EMERGENCY MEDICINE

## 2022-07-04 PROCEDURE — 80048 BASIC METABOLIC PNL TOTAL CA: CPT | Performed by: EMERGENCY MEDICINE

## 2022-07-04 PROCEDURE — 93010 ELECTROCARDIOGRAM REPORT: CPT

## 2022-07-04 PROCEDURE — 93010 ELECTROCARDIOGRAM REPORT: CPT | Performed by: EMERGENCY MEDICINE

## 2022-07-04 RX ORDER — DOXYCYCLINE HYCLATE 100 MG/1
100 CAPSULE ORAL 2 TIMES DAILY
Qty: 14 CAPSULE | Refills: 0 | Status: SHIPPED | OUTPATIENT
Start: 2022-07-04 | End: 2022-07-11

## 2022-07-04 RX ORDER — ALBUTEROL SULFATE 2.5 MG/3ML
5 SOLUTION RESPIRATORY (INHALATION) ONCE
Status: COMPLETED | OUTPATIENT
Start: 2022-07-04 | End: 2022-07-04

## 2022-07-04 RX ORDER — PREDNISONE 20 MG/1
40 TABLET ORAL DAILY
Qty: 10 TABLET | Refills: 0 | Status: SHIPPED | OUTPATIENT
Start: 2022-07-04 | End: 2022-07-09

## 2022-07-04 RX ORDER — ALBUTEROL SULFATE 90 UG/1
2 AEROSOL, METERED RESPIRATORY (INHALATION) EVERY 4 HOURS PRN
Qty: 18 G | Refills: 0 | Status: SHIPPED | OUTPATIENT
Start: 2022-07-04 | End: 2022-08-03

## 2022-07-04 RX ORDER — MELATONIN
325
Qty: 30 TABLET | Refills: 0 | Status: SHIPPED | OUTPATIENT
Start: 2022-07-04 | End: 2022-08-03

## 2022-07-05 NOTE — CM/SW NOTE
JULES received call from SABA with [facility] [address] inquiring if pt is in hospital. Of note, pt is reportedly current with their HH. JULES notified BEBE that pt was not admitted. SABA to notify [facility] office.      Fernando Ortiz MSW, [place], [place]   Ext [phone]

## 2022-10-26 ENCOUNTER — OFFICE VISIT (OUTPATIENT)
Dept: NEUROLOGY | Facility: CLINIC | Age: 77
End: 2022-10-26
Payer: MEDICARE

## 2022-10-26 VITALS
HEART RATE: 80 BPM | DIASTOLIC BLOOD PRESSURE: 76 MMHG | WEIGHT: 124 LBS | BODY MASS INDEX: 24.35 KG/M2 | HEIGHT: 60 IN | SYSTOLIC BLOOD PRESSURE: 114 MMHG

## 2022-10-26 DIAGNOSIS — R25.1 TREMOR: Primary | ICD-10-CM

## 2022-10-26 PROCEDURE — 99204 OFFICE O/P NEW MOD 45 MIN: CPT | Performed by: OTHER

## 2022-12-20 ENCOUNTER — PATIENT MESSAGE (OUTPATIENT)
Dept: ADMINISTRATIVE | Age: 77
End: 2022-12-20

## 2023-03-21 ENCOUNTER — OFFICE VISIT (OUTPATIENT)
Dept: OTOLARYNGOLOGY | Facility: CLINIC | Age: 78
End: 2023-03-21

## 2023-03-21 VITALS — TEMPERATURE: 97 F | HEIGHT: 60 IN | BODY MASS INDEX: 24.35 KG/M2 | WEIGHT: 124 LBS

## 2023-03-21 DIAGNOSIS — H61.23 CERUMEN DEBRIS ON TYMPANIC MEMBRANE OF BOTH EARS: ICD-10-CM

## 2023-03-21 DIAGNOSIS — H91.8X9 ASYMMETRICAL HEARING LOSS: ICD-10-CM

## 2023-03-21 DIAGNOSIS — H93.13 TINNITUS OF BOTH EARS: Primary | ICD-10-CM

## 2023-03-21 PROCEDURE — 99213 OFFICE O/P EST LOW 20 MIN: CPT | Performed by: SPECIALIST

## 2023-03-21 RX ORDER — CLOPIDOGREL BISULFATE 75 MG/1
75 TABLET ORAL DAILY
COMMUNITY
Start: 2023-03-07

## 2023-03-21 NOTE — PATIENT INSTRUCTIONS
You can reduce your sodium, caffeine, and try Lipo flavonoid for your tinnitus. As you have a history of asymmetric hearing loss, I would recommend a repeat audiogram.  Please get me a copy of the audiogram you have done on February 26, 2020 for comparison.

## 2024-01-09 ENCOUNTER — APPOINTMENT (OUTPATIENT)
Dept: GENERAL RADIOLOGY | Age: 79
End: 2024-01-09
Attending: NURSE PRACTITIONER
Payer: MEDICARE

## 2024-01-09 ENCOUNTER — HOSPITAL ENCOUNTER (OUTPATIENT)
Age: 79
Discharge: HOME | End: 2024-01-09
Payer: MEDICARE

## 2024-01-09 VITALS
TEMPERATURE: 99 F | WEIGHT: 132 LBS | RESPIRATION RATE: 20 BRPM | HEIGHT: 60 IN | BODY MASS INDEX: 25.91 KG/M2 | OXYGEN SATURATION: 95 % | HEART RATE: 97 BPM | DIASTOLIC BLOOD PRESSURE: 87 MMHG | SYSTOLIC BLOOD PRESSURE: 142 MMHG

## 2024-01-09 DIAGNOSIS — J18.9 COMMUNITY ACQUIRED PNEUMONIA OF LEFT LUNG, UNSPECIFIED PART OF LUNG: Primary | ICD-10-CM

## 2024-01-09 DIAGNOSIS — R05.9 COUGH: ICD-10-CM

## 2024-01-09 PROCEDURE — 71046 X-RAY EXAM CHEST 2 VIEWS: CPT | Performed by: NURSE PRACTITIONER

## 2024-01-09 PROCEDURE — 99213 OFFICE O/P EST LOW 20 MIN: CPT | Performed by: NURSE PRACTITIONER

## 2024-01-09 RX ORDER — AMOXICILLIN 500 MG/1
1000 TABLET, FILM COATED ORAL 3 TIMES DAILY
Qty: 42 TABLET | Refills: 0 | Status: SHIPPED | OUTPATIENT
Start: 2024-01-09 | End: 2024-01-16

## 2024-01-09 RX ORDER — AZITHROMYCIN 250 MG/1
TABLET, FILM COATED ORAL
Qty: 6 TABLET | Refills: 0 | Status: SHIPPED | OUTPATIENT
Start: 2024-01-09 | End: 2024-01-14

## 2024-01-09 NOTE — DISCHARGE INSTRUCTIONS
As discussed, we will treat you for suspected pneumonia.  There is some acute on chronic changes at left lung base.  Antibiotics prescribed.  You will follow directions on Z-Jeevan intake over the next 5 days.  Amoxicillin 3 times a day for 7 days.    Drink plenty of water and get plenty of rest.  Sleep somewhat elevated upright.  Sleep with humidifier if you have 1.    Please go to ER if you have any chest pain, dizziness colitis, palpitations, shortness of breath.

## 2024-01-09 NOTE — ED PROVIDER NOTES
Patient Seen in: [facility]      History   No chief complaint on file.    Stated Complaint: Cough    Subjective: This is a 78-year-old female, past medical history of anxiety, depression, CAD, COPD, hyperlipidemia, current tobacco use, presents to immediate care for cough and nasal congestion for the past 5 days.  Patient reports no fever, chills, fatigue, body aches.  No postnasal drip or sore throat.  Patient reports intermittent wheezing.  No chest pain, dizziness, lightness, palpitations.  No shortness of breath from baseline.  Patient is concerned that she may have pneumonia. She is speaking in complete and full sentences. Well appearing. Aox4   The history is provided by the patient.           Objective:   No pertinent past medical history.            No pertinent past surgical history.              No pertinent social history.            Review of Systems   Constitutional: Negative.    HENT: Negative.     Respiratory:  Positive for cough. Negative for chest tightness, shortness of breath, wheezing and stridor.    Cardiovascular: Negative.  Negative for chest pain and palpitations.   Gastrointestinal: Negative.        Positive for stated complaint: Cough  Other systems are as noted in HPI.  Constitutional and vital signs reviewed.      All other systems reviewed and negative except as noted above.    Physical Exam     ED Triage Vitals [01/09/24 1329]   /87   Pulse 97   Resp 20   Temp 99 °F (37.2 °C)   Temp src Oral   SpO2 95 %   O2 Device None (Room air)       Current:/87   Pulse 97   Temp 99 °F (37.2 °C) (Oral)   Resp 20   Ht 152.4 cm (5')   Wt 59.9 kg   SpO2 95%   BMI 25.78 kg/m²         Physical Exam  Constitutional:       General: She is not in acute distress.     Appearance: Normal appearance. She is not ill-appearing or toxic-appearing.   HENT:      Head: Normocephalic.      Right Ear: Tympanic membrane, ear canal and external ear normal.      Left Ear: Tympanic membrane,  ear canal and external ear normal.      Nose: Nose normal. No congestion or rhinorrhea.      Mouth/Throat:      Mouth: Mucous membranes are moist.      Pharynx: Oropharynx is clear. No oropharyngeal exudate or posterior oropharyngeal erythema.   Eyes:      General:         Right eye: No discharge.         Left eye: No discharge.      Extraocular Movements: Extraocular movements intact.      Pupils: Pupils are equal, round, and reactive to light.   Cardiovascular:      Rate and Rhythm: Normal rate and regular rhythm.      Pulses: Normal pulses.      Heart sounds: Normal heart sounds.   Pulmonary:      Effort: Pulmonary effort is normal. No accessory muscle usage, prolonged expiration or respiratory distress.      Breath sounds: Decreased air movement present. Examination of the left-middle field reveals decreased breath sounds. Examination of the left-lower field reveals decreased breath sounds. Decreased breath sounds present. No wheezing.   Musculoskeletal:         General: Normal range of motion.      Cervical back: Normal range of motion and neck supple.   Lymphadenopathy:      Cervical: No cervical adenopathy.   Skin:     General: Skin is warm.      Capillary Refill: Capillary refill takes less than 2 seconds.   Neurological:      General: No focal deficit present.      Mental Status: She is alert and oriented to person, place, and time.               ED Course   Labs Reviewed - No data to display     XR CHEST PA + LAT CHEST (CPT=71046)    Result Date: 1/9/2024  CONCLUSION:  1. Prominent bibasilar pulmonary markings with interval progression left lung base retrocardiac region.  Suspect acute on chronic changes left basilar region.  Follow-up to resolution. 2. COPD/hyperlucency upper lobes..    Dictated by (CST): Felton [place] MD on 1/09/2024 at 2:04 PM     Finalized by (CST): Felton [place] MD on 1/09/2024 at 2:07 PM                       [facility]      This is a 78-year-old female, past medical history of  hypertension, CAD, COPD, current tobacco user.  Patient smokes half a pack a day.  Patient here for cough congestion for the past 5 days.  Patient concerned regarding pneumonia.    Patient denies any fever, chills, fatigue.  No chest pain, dizziness, lightheadedness, palpitations, shortness of breath.  Lungs with tight and poor air movement.  Slightly diminished breath sounds at left middle and lower lobes.      Chest x-ray obtained.  There is acute and chronic changes at left lung base which correlates with lung auscultation.  Will treat as suspected pneumonia based on patient's age, lung auscultation, chest x-ray and compromised pulmonology.    Amoxicillin and azithromycin prescribed.  Patient is aware to sleep somewhat elevated upright.  Sleep with modifier.  She is aware to drink plenty water and get plenty rest.    Educated patient to go to ER if she has any chest pain, dizziness, lightheadedness, palpitations, shortness of breath.                                   Medical Decision Making  Amount and/or Complexity of Data Reviewed  Radiology: ordered.        Disposition and Plan     Clinical Impression:  1. Community acquired pneumonia of left lung, unspecified part of lung    2. Cough         Disposition:  Discharge  1/9/2024  2:16 pm    Follow-up:  Seth Anton MD  [address] [place] [address]  [address]  [place] [address]  [phone]      As needed          Medications Prescribed:  Discharge Medication List as of 1/9/2024  2:16 PM        START taking these medications    Details   amoxicillin 500 MG Oral Tab Take 2 tablets (1,000 mg total) by mouth 3 (three) times daily for 7 days., Normal, Disp-42 tablet, R-0      azithromycin (ZITHROMAX Z-JONATHON) 250 MG Oral Tab 500 mg once followed by 250 mg daily x 4 days, Normal, Disp-6 tablet, R-0

## 2024-01-09 NOTE — ED INITIAL ASSESSMENT (HPI)
C/o cough and congestion x 4 days denies fever pt does not have asthma .   Pt took home test for flu and was negative

## 2024-04-23 ENCOUNTER — OFFICE VISIT (OUTPATIENT)
Dept: OTOLARYNGOLOGY | Facility: CLINIC | Age: 79
End: 2024-04-23

## 2024-04-23 DIAGNOSIS — H61.23 CERUMEN DEBRIS ON TYMPANIC MEMBRANE OF BOTH EARS: ICD-10-CM

## 2024-04-23 DIAGNOSIS — H93.13 BILATERAL TINNITUS: Primary | ICD-10-CM

## 2024-04-23 DIAGNOSIS — H91.8X3 ASYMMETRICAL HEARING LOSS: ICD-10-CM

## 2024-04-23 PROCEDURE — 99213 OFFICE O/P EST LOW 20 MIN: CPT | Performed by: SPECIALIST

## 2024-04-23 RX ORDER — LORAZEPAM 0.5 MG/1
TABLET ORAL
COMMUNITY
Start: 2024-03-19

## 2024-04-23 RX ORDER — OLANZAPINE 5 MG/1
5 TABLET ORAL NIGHTLY
COMMUNITY
Start: 2024-04-04

## 2024-04-23 NOTE — PROGRESS NOTES
Jazmin Nesbitt is a 78 year old female.   Chief Complaint   Patient presents with    Ear Wax     Ear cleaning, right ear clogged     HPI:   Patient here with bilateral tinnitus.  Feels like her ear is plugged.    Current Outpatient Medications   Medication Sig Dispense Refill    LORazepam 0.5 MG Oral Tab       OLANZapine 5 MG Oral Tab Take 1 tablet (5 mg total) by mouth nightly.      clopidogrel 75 MG Oral Tab Take 1 tablet (75 mg total) by mouth daily.      clonazePAM 0.5 MG Oral Tab Take 1 tablet (0.5 mg total) by mouth nightly.      atorvastatin 20 MG Oral Tab Take 1 tablet (20 mg total) by mouth daily. 90 tablet 3    Albuterol Sulfate  (90 Base) MCG/ACT Inhalation Aero Soln Inhale 2 puffs into the lungs every 4 (four) hours as needed for Wheezing or Shortness of Breath. 1 each 0    QUEtiapine Fumarate 25 MG Oral Tab Take 1 tablet (25 mg total) by mouth nightly.      aspirin 81 MG Oral Tab EC Take 1 tablet (81 mg total) by mouth daily.      gabapentin 600 MG Oral Tab Take 1 tablet (600 mg total) by mouth 3 (three) times daily.      Venlafaxine HCl  MG Oral Tablet 24 Hr Take 1 tablet (150 mg total) by mouth daily.      mirtazapine 30 MG Oral Tab Take 1 tablet (30 mg total) by mouth nightly. 90 tablet 1    celecoxib 200 MG Oral Cap Take 200 mg by mouth daily with food.      lithium carbonate 150 MG Oral Cap Take 150 mg by mouth 2 (two) times daily.        Past Medical History:    COPD (chronic obstructive pulmonary disease) (HCC)    Coronary artery disease    Depression with anxiety    Sees Dr. Mcwilliams    Esophageal reflux    Essential hypertension    Hyperlipidemia    Obesity    Osteoarthritis    Unstable angina (HCC)      Social History:  Social History     Socioeconomic History    Marital status:    Tobacco Use    Smoking status: Every Day     Current packs/day: 1.00     Average packs/day: 1 pack/day for 60.0 years (60.0 ttl pk-yrs)     Types: Cigarettes     Start date: 2002    Smokeless  tobacco: Never   Substance and Sexual Activity    Alcohol use: Yes     Comment: rare to never    Drug use: No   Social History Narrative    ,     2 daughters    Working parttime at [employer] in [facility].        REVIEW OF SYSTEMS:   GENERAL HEALTH: feels well otherwise  GENERAL : denies fever, chills, sweats, weight loss, weight gain  SKIN: denies any unusual skin lesions or rashes  RESPIRATORY: denies shortness of breath with exertion  NEURO: denies headaches    EXAM:   There were no vitals taken for this visit.  System Details   Skin Inspection - Normal.   Constitutional Overall appearance - Normal.   Head/Face Facial features - Normal. Eyebrows - Normal. Skull - Normal.   Eyes Conjunctiva - Right: Normal, Left: Normal. Pupil - Right: Normal, Left: Normal.    Ears Inspection - Right: Normal, Left: Normal.   Ears = bilateral cerumen occlussions.  Right greater than left  Fully cleaned under microscope using instrumentation and suctioning.    Normal tympanic membranes.     Nasal External nose - Normal.   Nasal septum - Normal.  Turbinates - Normal.   Oral/Oropharynx Lips - Normal, Tonsils - Normal, Tongue - Normal    Neck Exam Inspection - Normal. Palpation - Normal. Parotid gland - Normal. Thyroid gland - Normal.  No carotid bruits by auscultation   Lymph Detail Submental. Submandibular. Anterior cervical. Posterior cervical. Supraclavicular all without enlargement   Psychiatric Orientation - Oriented to time, place, person & situation. Appropriate mood and affect.   Neurological Memory - Normal. Cranial nerves - Cranial nerves II through XII grossly intact.     ASSESSMENT AND PLAN:   1. Bilateral tinnitus  Will repeat audiogram compared to 1 done 2/17/2020  - Audiology Referral - [facility])    2. Cerumen debris on tympanic membrane of both ears  Fully cleaned as above    3. Asymmetrical hearing loss  Reviewed audiogram done February 17, 2020 which showed an asymmetric hearing loss left greater  than right.  No MRI available for review to explain asymmetric loss.      The patient indicates understanding of these issues and agrees to the plan.      Tuyet Wells MD  4/23/2024  1:49 PM

## 2024-04-23 NOTE — PATIENT INSTRUCTIONS
Cerumen was fully cleaned from both your ears.  An audiogram was ordered to evaluate your bilateral tinnitus.  Review of your audiogram in February 17, 2020 showed an asymmetric hearing loss left greater than right.

## 2024-05-10 ENCOUNTER — HOSPITAL ENCOUNTER (OUTPATIENT)
Age: 79
Discharge: HOME | End: 2024-05-10

## 2024-05-10 ENCOUNTER — APPOINTMENT (OUTPATIENT)
Dept: GENERAL RADIOLOGY | Age: 79
End: 2024-05-10
Attending: NURSE PRACTITIONER

## 2024-05-10 VITALS
SYSTOLIC BLOOD PRESSURE: 128 MMHG | HEART RATE: 86 BPM | TEMPERATURE: 98 F | DIASTOLIC BLOOD PRESSURE: 83 MMHG | OXYGEN SATURATION: 98 % | RESPIRATION RATE: 20 BRPM

## 2024-05-10 DIAGNOSIS — U07.1 COVID-19: ICD-10-CM

## 2024-05-10 DIAGNOSIS — J18.9 COMMUNITY ACQUIRED PNEUMONIA OF RIGHT MIDDLE LOBE OF LUNG: ICD-10-CM

## 2024-05-10 DIAGNOSIS — R05.1 ACUTE COUGH: Primary | ICD-10-CM

## 2024-05-10 LAB — SARS-COV-2 RNA RESP QL NAA+PROBE: DETECTED

## 2024-05-10 PROCEDURE — 99213 OFFICE O/P EST LOW 20 MIN: CPT | Performed by: NURSE PRACTITIONER

## 2024-05-10 PROCEDURE — U0002 COVID-19 LAB TEST NON-CDC: HCPCS | Performed by: NURSE PRACTITIONER

## 2024-05-10 PROCEDURE — 71046 X-RAY EXAM CHEST 2 VIEWS: CPT | Performed by: NURSE PRACTITIONER

## 2024-05-10 RX ORDER — DOXYCYCLINE HYCLATE 100 MG/1
100 CAPSULE ORAL 2 TIMES DAILY
Qty: 14 CAPSULE | Refills: 0 | Status: SHIPPED | OUTPATIENT
Start: 2024-05-10 | End: 2024-05-17

## 2024-05-10 RX ORDER — AMOXICILLIN AND CLAVULANATE POTASSIUM 875; 125 MG/1; MG/1
1 TABLET, FILM COATED ORAL 2 TIMES DAILY
Qty: 14 TABLET | Refills: 0 | Status: SHIPPED | OUTPATIENT
Start: 2024-05-10 | End: 2024-05-17

## 2024-05-10 NOTE — ED INITIAL ASSESSMENT (HPI)
Patient reports having a cough with a lot of mucus along with cold sweats and a questionable fever overnight. Patient had leftover Amoxicillin 500mg tabs that she took 3 doses of starting early this morning. Patient also took Motrin today. Patient with PMH of being smoker and denies any additional shortness of breath.

## 2024-05-10 NOTE — ED PROVIDER NOTES
Patient Seen in: [facility]      History   No chief complaint on file.    Stated Complaint: Cough    Subjective:   78-year-old female with COPD, hypertension, high cholesterol, depression, anxiety presents from home.  Patient is here with concern for recurrent pneumonia.  States cough, mucus, cold sweats that started yesterday.  Tactile fever.  She did take some ibuprofen at home and took some leftover amoxicillin that she had.  No COVID testing was done.  She is a current smoker, approximately half a pack a day.  She has chronic shortness of breath from COPD but denies any acute changes recently.  States she had pneumonia few months ago that felt the same.  She is vaccinated for COVID.    The history is provided by the patient. No  was used.         Objective:   Past Medical History:    COPD (chronic obstructive pulmonary disease) (HCC)    Coronary artery disease    Depression with anxiety    Sees Dr. Mcwilliams    Esophageal reflux    Essential hypertension    Hyperlipidemia    Obesity    Osteoarthritis    Unstable angina (HCC)              No pertinent past surgical history.              No pertinent social history.            Review of Systems    Positive for stated complaint: Cough  Other systems are as noted in HPI.  Constitutional and vital signs reviewed.      All other systems reviewed and negative except as noted above.    Physical Exam     ED Triage Vitals [05/10/24 1416]   /83   Pulse 86   Resp 20   Temp 98.1 °F (36.7 °C)   Temp src Oral   SpO2 98 %   O2 Device None (Room air)       Current Vitals:   Vital Signs  BP: 128/83  Pulse: 86  Resp: 20  Temp: 98.1 °F (36.7 °C)  Temp src: Oral    Oxygen Therapy  SpO2: 98 %  O2 Device: None (Room air)            Physical Exam  Vitals and nursing note reviewed.   Constitutional:       General: She is not in acute distress.     Appearance: Normal appearance. She is not ill-appearing or toxic-appearing.   HENT:      Head:  Normocephalic and atraumatic.      Nose: Nose normal.      Mouth/Throat:      Mouth: Mucous membranes are moist.      Pharynx: Oropharynx is clear.   Eyes:      Pupils: Pupils are equal, round, and reactive to light.   Cardiovascular:      Rate and Rhythm: Normal rate and regular rhythm.      Pulses: Normal pulses.   Pulmonary:      Effort: Pulmonary effort is normal. No respiratory distress.      Breath sounds: Normal breath sounds.      Comments: Increased aeration throughout.  No wheezing.  No respiratory distress.  No hypoxia.  Pulse ox 98% room air which is normal.  No persistent coughing on exam.  Abdominal:      General: Abdomen is flat.      Palpations: Abdomen is soft.   Musculoskeletal:         General: No signs of injury. Normal range of motion.      Cervical back: Normal range of motion and neck supple.   Skin:     General: Skin is warm and dry.      Capillary Refill: Capillary refill takes less than 2 seconds.   Neurological:      General: No focal deficit present.      Mental Status: She is alert and oriented to person, place, and time.      GCS: GCS eye subscore is 4. GCS verbal subscore is 5. GCS motor subscore is 6.   Psychiatric:         Mood and Affect: Mood normal.         Behavior: Behavior normal.         Thought Content: Thought content normal.         Judgment: Judgment normal.               ED Course     Labs Reviewed   RAPID SARS-COV-2 BY PCR - Abnormal; Notable for the following components:       Result Value    Rapid SARS-CoV-2 by PCR Detected (*)     All other components within normal limits     Recent Results (from the past 24 hour(s))   Rapid SARS-CoV-2 by PCR    Collection Time: 05/10/24  2:23 PM    Specimen: Nares; Other   Result Value Ref Range    Rapid SARS-CoV-2 by PCR Detected (A) Not Detected     XR CHEST PA + LAT CHEST (CPT=71046)    Result Date: 5/10/2024  CONCLUSION:   Right perihilar opacity, which is concerning for pneumonia.  Recommend follow-up chest radiograph in 4-6  weeks to monitor for resolution.  Redemonstrated emphysematous changes with left greater than right basilar atelectasis/scarring.     Dictated by (CST): Los Guardado MD on 5/10/2024 at 2:42 PM     Finalized by (CST): Los Guardado MD on 5/10/2024 at 2:46 PM             [facility]          Medical Decision Making  Differential diagnosis: COVID, pneumonia, COPD, viral URI  COVID test is positive   chest x-ray showing a right middle lobe pneumonia  Patient well-appearing on exam.  No respiratory distress.  No hypoxia.  Pulse ox 98% room air which is normal  Patient is appropriate for outpatient treatment of community-acquired pneumonia  Discussed viral versus bacterial origin of pneumonia, given COVID test is positive  Plan of care: Augmentin, doxycycline.  Needs to see your primary doctor for follow-up within the next few days, I encouraged her to call now to make the appointment.  Stressed that she should go to the emergency room if she has increased shortness of breath.  Smoking cessation encouraged  Results and plan of care discussed with the patient/family. They are in agreement with discharge. They understand to follow up with their primary doctor or the referral physician for further evaluation, especially if no improvement.  Also discussed the limitations of immediate care, patient is aware that if symptoms are worse they should go to the emergency room. Verbal and written discharge instructions were given.       Problems Addressed:  Acute cough: acute illness or injury  Community acquired pneumonia of right middle lobe of lung: acute illness or injury  COVID-19: acute illness or injury    Amount and/or Complexity of Data Reviewed  Independent Historian:      Details: daughter  Labs: ordered. Decision-making details documented in ED Course.  Radiology: ordered and independent interpretation performed. Decision-making details documented in ED Course.     Details: RML opacity    Risk  OTC drugs.  Prescription drug  management.        Disposition and Plan     Clinical Impression:  1. Acute cough    2. Community acquired pneumonia of right middle lobe of lung    3. COVID-19         Disposition:  Discharge  5/10/2024  2:53 pm    Follow-up:  Seth Anton MD  [address] [place] [id number]  [phone]    Call today            Medications Prescribed:  Discharge Medication List as of 5/10/2024  2:53 PM        START taking these medications    Details   amoxicillin clavulanate 875-125 MG Oral Tab Take 1 tablet by mouth 2 (two) times daily for 7 days., Normal, Disp-14 tablet, R-0      doxycycline 100 MG Oral Cap Take 1 capsule (100 mg total) by mouth 2 (two) times daily for 7 days., Normal, Disp-14 capsule, R-0

## 2024-05-10 NOTE — DISCHARGE INSTRUCTIONS
COVID test is positive.  You also have pneumonia again.  Take both antibiotics as prescribed.  Stay home while having a fever and feeling ill.  Rest.  Stay hydrated.  Continue ibuprofen as needed for fever.  Call your doctor now to schedule follow-up early next week.  Go to the emergency room if worsening symptoms, shortness of breath

## 2024-07-11 ENCOUNTER — OFFICE VISIT (OUTPATIENT)
Dept: AUDIOLOGY | Facility: CLINIC | Age: 79
End: 2024-07-11
Payer: MEDICARE

## 2024-07-11 DIAGNOSIS — H90.A32 MIXED CONDUCTIVE AND SENSORINEURAL HEARING LOSS OF LEFT EAR WITH RESTRICTED HEARING OF RIGHT EAR: Primary | ICD-10-CM

## 2024-07-11 DIAGNOSIS — H72.92 PERFORATION OF LEFT TYMPANIC MEMBRANE: ICD-10-CM

## 2024-07-11 DIAGNOSIS — H90.A21 SENSORINEURAL HEARING LOSS (SNHL) OF RIGHT EAR WITH RESTRICTED HEARING OF LEFT EAR: ICD-10-CM

## 2024-07-11 PROCEDURE — 92567 TYMPANOMETRY: CPT | Performed by: AUDIOLOGIST

## 2024-07-11 PROCEDURE — 92557 COMPREHENSIVE HEARING TEST: CPT | Performed by: AUDIOLOGIST

## 2024-07-12 NOTE — TELEPHONE ENCOUNTER
Left a message for the patient.  There has been some progression of hearing loss in both ears.  Word discrimination score is also decreased from 96% to 84% on the right and 88 to 64% on the left.  If she does not already have them she would benefit from hearing aids in both ears.  She can call with any additional questions.

## 2024-09-03 ENCOUNTER — OFFICE VISIT (OUTPATIENT)
Dept: OTOLARYNGOLOGY | Facility: CLINIC | Age: 79
End: 2024-09-03

## 2024-09-03 VITALS — BODY MASS INDEX: 25.91 KG/M2 | WEIGHT: 132 LBS | HEIGHT: 60 IN

## 2024-09-03 DIAGNOSIS — H92.12 OTORRHEA, LEFT: Primary | ICD-10-CM

## 2024-09-03 PROCEDURE — 99213 OFFICE O/P EST LOW 20 MIN: CPT | Performed by: SPECIALIST

## 2024-09-03 RX ORDER — AZITHROMYCIN 250 MG/1
TABLET, FILM COATED ORAL
Qty: 6 TABLET | Refills: 0 | Status: SHIPPED | OUTPATIENT
Start: 2024-09-03

## 2024-09-03 RX ORDER — OFLOXACIN 3 MG/ML
5 SOLUTION AURICULAR (OTIC) 2 TIMES DAILY
Qty: 5 ML | Refills: 0 | Status: SHIPPED | OUTPATIENT
Start: 2024-09-03 | End: 2024-09-13

## 2024-09-03 NOTE — PROGRESS NOTES
Jazmin Nesbitt is a 78 year old female.   Chief Complaint   Patient presents with    Ear Problem     Drainage in left ear since 7/11/24     HPI:   Patient here for left otorrhea.  She thinks it has been infected since July.  At that point, he had a type B tympanogram and mixed hearing loss on the left.    Current Outpatient Medications   Medication Sig Dispense Refill    azithromycin (ZITHROMAX Z-JONATHON) 250 MG Oral Tab Take 1 by oral route every day for 5 days. 2 tablets today. 6 tablet 0    ofloxacin 0.3 % Otic Solution Place 5 drops into the left ear 2 (two) times daily for 10 days. 5 mL 0    OLANZapine 5 MG Oral Tab Take 1 tablet (5 mg total) by mouth nightly.      aspirin 81 MG Oral Tab EC Take 1 tablet (81 mg total) by mouth daily.      gabapentin 600 MG Oral Tab Take 1 tablet (600 mg total) by mouth 3 (three) times daily.      Venlafaxine HCl  MG Oral Tablet 24 Hr Take 1 tablet (150 mg total) by mouth daily.      mirtazapine 30 MG Oral Tab Take 1 tablet (30 mg total) by mouth nightly. 90 tablet 1    LORazepam 0.5 MG Oral Tab       clopidogrel 75 MG Oral Tab Take 1 tablet (75 mg total) by mouth daily.      celecoxib 200 MG Oral Cap Take 200 mg by mouth daily with food.      clonazePAM 0.5 MG Oral Tab Take 1 tablet (0.5 mg total) by mouth nightly.      lithium carbonate 150 MG Oral Cap Take 150 mg by mouth 2 (two) times daily.      atorvastatin 20 MG Oral Tab Take 1 tablet (20 mg total) by mouth daily. 90 tablet 3    Albuterol Sulfate  (90 Base) MCG/ACT Inhalation Aero Soln Inhale 2 puffs into the lungs every 4 (four) hours as needed for Wheezing or Shortness of Breath. 1 each 0    QUEtiapine Fumarate 25 MG Oral Tab Take 1 tablet (25 mg total) by mouth nightly.        Past Medical History:    COPD (chronic obstructive pulmonary disease) (HCC)    Coronary artery disease    Depression with anxiety    Sees Dr. Mcwilliams    Esophageal reflux    Essential hypertension    Hyperlipidemia    Obesity     Osteoarthritis    Unstable angina (HCC)      Social History:  Social History     Socioeconomic History    Marital status:    Tobacco Use    Smoking status: Every Day     Current packs/day: 1.00     Average packs/day: 1 pack/day for 60.0 years (60.0 ttl pk-yrs)     Types: Cigarettes     Start date: 2002    Smokeless tobacco: Never   Substance and Sexual Activity    Alcohol use: Yes     Comment: rare to never    Drug use: No   Social History Narrative    ,     2 daughters    Working parttime at [employer] in [place].        REVIEW OF SYSTEMS:   GENERAL HEALTH: feels well otherwise  GENERAL : denies fever, chills, sweats, weight loss, weight gain  SKIN: denies any unusual skin lesions or rashes  RESPIRATORY: denies shortness of breath with exertion  NEURO: denies headaches    EXAM:   Ht 5' (1.524 m)   Wt 132 lb (59.9 kg)   BMI 25.78 kg/m²   System Details   Skin Inspection - Normal.   Constitutional Overall appearance - Normal.   Head/Face Facial features - Normal. Eyebrows - Normal. Skull - Normal.   Eyes Conjunctiva - Right: Normal, Left: Normal. Pupil - Right: Normal, Left: Normal.    Ears Inspection - Right: Normal, Left: Normal.   Canal - Right: Normal, Left: Drainage and blood in the left external auditory canal  TM - Right: Normal, Left: Clear fluid pulsating from the tympanic membrane.  Culture taken.  I could not identify the perforation.   Nasal External nose - Normal.   Nasal septum - Normal.  Turbinates - Normal.   Oral/Oropharynx Lips - Normal, Tonsils - Normal, Tongue - Normal    Neck Exam Inspection - Normal. Palpation - Normal. Parotid gland - Normal. Thyroid gland - Normal.   Lymph Detail Submental. Submandibular. Anterior cervical. Posterior cervical. Supraclavicular all without enlargement   Nasopharynx No tumors or masses by mirror examination   Psychiatric Orientation - Oriented to time, place, person & situation. Appropriate mood and affect.   Neurological Memory - Normal. Cranial  nerves - Cranial nerves II through XII grossly intact.     ASSESSMENT AND PLAN:   1. Otorrhea, left  Large amount of clear otorrhea and blood.  Culture taken.  Patient placed on Zithromycin and Floxin drops.  I will of course call her with the results.  Reviewed audiogram done 7/11/2024.  This showed an asymmetric hearing loss left greater than right with a flat tympanogram.  At that point volumes seemed more consistent with middle ear fluid than perforation.  Follow-up in 2 weeks time, sooner if problems.  - Aerobic Bacterial Culture; Future  - Aerobic Bacterial Culture      The patient indicates understanding of these issues and agrees to the plan.      Tuyet Wells MD  9/3/2024  3:57 PM

## 2024-09-03 NOTE — PATIENT INSTRUCTIONS
You had a left otorrhea.  I suspect there is an underlying perforation.  A culture was taken.  You are placed on Zithromycin and ofloxacin drops.  Follow-up in 2 weeks time, sooner if problems.

## 2024-09-05 NOTE — PROGRESS NOTES
Await sensitivities.  Should be sensitive to the ofloxin drops.  I may need to change the zithromax.

## 2024-09-23 ENCOUNTER — OFFICE VISIT (OUTPATIENT)
Dept: OTOLARYNGOLOGY | Facility: CLINIC | Age: 79
End: 2024-09-23

## 2024-09-23 VITALS — BODY MASS INDEX: 25.91 KG/M2 | WEIGHT: 132 LBS | HEIGHT: 60 IN

## 2024-09-23 DIAGNOSIS — H92.12 OTORRHEA, LEFT: Primary | ICD-10-CM

## 2024-09-23 PROCEDURE — 99213 OFFICE O/P EST LOW 20 MIN: CPT | Performed by: SPECIALIST

## 2024-09-23 RX ORDER — OFLOXACIN 3 MG/ML
5 SOLUTION AURICULAR (OTIC) 2 TIMES DAILY
Qty: 5 ML | Refills: 0 | Status: SHIPPED | OUTPATIENT
Start: 2024-09-23 | End: 2024-10-03

## 2024-09-23 RX ORDER — SULFAMETHOXAZOLE/TRIMETHOPRIM 800-160 MG
1 TABLET ORAL EVERY 12 HOURS
Qty: 20 TABLET | Refills: 0 | Status: SHIPPED | OUTPATIENT
Start: 2024-09-23

## 2024-09-24 NOTE — PATIENT INSTRUCTIONS
Your left otorrhea was 95% improved.  I refilled the Bactrim and Floxin drops.  Follow-up in 3 weeks time if symptoms are not completely resolved.

## 2024-09-24 NOTE — PROGRESS NOTES
Jazmin Nesbitt is a 78 year old female.   Chief Complaint   Patient presents with    Follow - Up     otorrhea, left     HPI:   Patient here with an improvement on her left otorrhea.  This grew a Enterobacter cloacae.  She was on Bactrim and Floxin drops.    Current Outpatient Medications   Medication Sig Dispense Refill    sulfamethoxazole-trimethoprim -160 MG Oral Tab per tablet Take 1 tablet by mouth every 12 (twelve) hours. 20 tablet 0    ofloxacin 0.3 % Otic Solution Place 5 drops into the left ear 2 (two) times daily for 10 days. 5 mL 0    azithromycin (ZITHROMAX Z-JONATHON) 250 MG Oral Tab Take 1 by oral route every day for 5 days. 2 tablets today. 6 tablet 0    OLANZapine 5 MG Oral Tab Take 1 tablet (5 mg total) by mouth nightly.      aspirin 81 MG Oral Tab EC Take 1 tablet (81 mg total) by mouth daily.      gabapentin 600 MG Oral Tab Take 1 tablet (600 mg total) by mouth 3 (three) times daily.      Venlafaxine HCl  MG Oral Tablet 24 Hr Take 1 tablet (150 mg total) by mouth daily.      mirtazapine 30 MG Oral Tab Take 1 tablet (30 mg total) by mouth nightly. 90 tablet 1    LORazepam 0.5 MG Oral Tab       clopidogrel 75 MG Oral Tab Take 1 tablet (75 mg total) by mouth daily.      celecoxib 200 MG Oral Cap Take 200 mg by mouth daily with food.      clonazePAM 0.5 MG Oral Tab Take 1 tablet (0.5 mg total) by mouth nightly.      lithium carbonate 150 MG Oral Cap Take 150 mg by mouth 2 (two) times daily.      atorvastatin 20 MG Oral Tab Take 1 tablet (20 mg total) by mouth daily. 90 tablet 3    Albuterol Sulfate  (90 Base) MCG/ACT Inhalation Aero Soln Inhale 2 puffs into the lungs every 4 (four) hours as needed for Wheezing or Shortness of Breath. 1 each 0    QUEtiapine Fumarate 25 MG Oral Tab Take 1 tablet (25 mg total) by mouth nightly.        Past Medical History:    COPD (chronic obstructive pulmonary disease) (HCC)    Coronary artery disease    Depression with anxiety    Sees Dr. Mcwilliams     Esophageal reflux    Essential hypertension    Hyperlipidemia    Obesity    Osteoarthritis    Unstable angina (HCC)      Social History:  Social History     Socioeconomic History    Marital status:    Tobacco Use    Smoking status: Every Day     Current packs/day: 1.00     Average packs/day: 1 pack/day for 60.0 years (60.0 ttl pk-yrs)     Types: Cigarettes     Start date: 2002    Smokeless tobacco: Never   Substance and Sexual Activity    Alcohol use: Yes     Comment: rare to never    Drug use: No   Social History Narrative    ,     2 daughters    Working parttime at [employer] in [employer].        REVIEW OF SYSTEMS:   GENERAL HEALTH: feels well otherwise  GENERAL : denies fever, chills, sweats, weight loss, weight gain  SKIN: denies any unusual skin lesions or rashes  RESPIRATORY: denies shortness of breath with exertion  NEURO: denies headaches    EXAM:   Ht 5' (1.524 m)   Wt 132 lb (59.9 kg)   BMI 25.78 kg/m²   System Details   Skin Inspection - Normal.   Constitutional Overall appearance - Normal.   Head/Face Facial features - Normal. Eyebrows - Normal. Skull - Normal.   Eyes Conjunctiva - Right: Normal, Left: Normal. Pupil - Right: Normal, Left: Normal.    Ears Inspection - Right: Normal, Left: Normal.   Canal - Right: Normal, Left: Normal.   TM - Right: Normal, Left: Very scant persistent debris fully suctioned.  Tympanic membrane intact.  No middle ear fluid.   Nasal External nose - Normal.   Nasal septum - Normal.  Turbinates - Normal.   Oral/Oropharynx Lips - Normal, Tonsils - Normal, Tongue - Normal    Neck Exam Inspection - Normal. Palpation - Normal. Parotid gland - Normal. Thyroid gland - Normal.   Lymph Detail Submental. Submandibular. Anterior cervical. Posterior cervical. Supraclavicular all without enlargement   Psychiatric Orientation - Oriented to time, place, person & situation. Appropriate mood and affect.   Neurological Memory - Normal. Cranial nerves - Cranial nerves II through XII  grossly intact.     ASSESSMENT AND PLAN:   1. Otorrhea, left  Markedly improved but not resolved.  Refill of Bactrim and Floxin drops.  Patient to follow-up in 3 weeks time if symptoms are not completely resolved.      The patient indicates understanding of these issues and agrees to the plan.      Tuyet Wells MD  9/23/2024  8:01 PM

## 2025-06-03 ENCOUNTER — OFFICE VISIT (OUTPATIENT)
Dept: OTOLARYNGOLOGY | Facility: CLINIC | Age: 80
End: 2025-06-03

## 2025-06-03 VITALS — WEIGHT: 132 LBS | HEIGHT: 60 IN | BODY MASS INDEX: 25.91 KG/M2

## 2025-06-03 DIAGNOSIS — H61.23 CERUMEN DEBRIS ON TYMPANIC MEMBRANE OF BOTH EARS: Primary | ICD-10-CM

## 2025-06-03 PROCEDURE — 69210 REMOVE IMPACTED EAR WAX UNI: CPT | Performed by: SPECIALIST

## 2025-06-03 RX ORDER — ALPRAZOLAM 0.25 MG
0.25 TABLET ORAL 2 TIMES DAILY PRN
COMMUNITY
Start: 2025-05-08

## 2025-06-03 NOTE — PROGRESS NOTES
Ears = bilateral cerumen occlussions.    Fully cleaned under microscope using instrumentation and suctioning.    Normal tympanic membranes.  Follow-up in 4 months time, sooner if problems.

## 2025-06-03 NOTE — PATIENT INSTRUCTIONS
Cerumen was fully cleaned from both your ears.  Follow-up in 4 months time, sooner if problems.

## (undated) NOTE — LETTER
Date & Time: 12/23/2021, 5:16 PM  Patient: Damon Parr  Encounter Provider(s):    PAVITHRA Van       To Whom It May Concern:    Meliton Brandt was seen and treated in our department on 12/23/2021. She can return to work on 12/26/2021.      I

## (undated) NOTE — LETTER
2021    Patient: Vanessa Zuleta  : 1945    To Whom It May Concern,     Mrs. Marco Antonio Oscar was admitted to [facility] on 21 and discharged on 21.  She will likely be stable to return to work on